# Patient Record
Sex: MALE | Race: ASIAN | NOT HISPANIC OR LATINO | URBAN - METROPOLITAN AREA
[De-identification: names, ages, dates, MRNs, and addresses within clinical notes are randomized per-mention and may not be internally consistent; named-entity substitution may affect disease eponyms.]

---

## 2019-04-16 ENCOUNTER — EMERGENCY (EMERGENCY)
Facility: HOSPITAL | Age: 64
LOS: 1 days | Discharge: HOME | End: 2019-04-16
Attending: EMERGENCY MEDICINE | Admitting: EMERGENCY MEDICINE
Payer: COMMERCIAL

## 2019-04-16 VITALS
TEMPERATURE: 98 F | WEIGHT: 186.07 LBS | DIASTOLIC BLOOD PRESSURE: 91 MMHG | HEIGHT: 70 IN | OXYGEN SATURATION: 98 % | HEART RATE: 88 BPM | RESPIRATION RATE: 16 BRPM | SYSTOLIC BLOOD PRESSURE: 147 MMHG

## 2019-04-16 PROCEDURE — 90715 TDAP VACCINE 7 YRS/> IM: CPT

## 2019-04-16 PROCEDURE — 99283 EMERGENCY DEPT VISIT LOW MDM: CPT | Mod: 25

## 2019-04-16 PROCEDURE — 12002 RPR S/N/AX/GEN/TRNK2.6-7.5CM: CPT

## 2019-04-16 PROCEDURE — 90471 IMMUNIZATION ADMIN: CPT

## 2019-04-16 PROCEDURE — 99283 EMERGENCY DEPT VISIT LOW MDM: CPT

## 2019-04-16 RX ORDER — TETANUS TOXOID, REDUCED DIPHTHERIA TOXOID AND ACELLULAR PERTUSSIS VACCINE, ADSORBED 5; 2.5; 8; 8; 2.5 [IU]/.5ML; [IU]/.5ML; UG/.5ML; UG/.5ML; UG/.5ML
0.5 SUSPENSION INTRAMUSCULAR ONCE
Qty: 0 | Refills: 0 | Status: COMPLETED | OUTPATIENT
Start: 2019-04-16 | End: 2019-04-16

## 2019-04-16 RX ADMIN — TETANUS TOXOID, REDUCED DIPHTHERIA TOXOID AND ACELLULAR PERTUSSIS VACCINE, ADSORBED 0.5 MILLILITER(S): 5; 2.5; 8; 8; 2.5 SUSPENSION INTRAMUSCULAR at 16:06

## 2019-04-16 NOTE — ED PROVIDER NOTE - MUSCULOSKELETAL, MLM
Spine appears normal, range of motion is not limited, no muscle or joint tenderness; +4cm superficial irregular laceration noted to palmar aspect of L wrist, FROM wrist, no FB noted, no tendon involvement noted, no obvious deformities noted, fingers warm & mobile, cap refill<2sec, able to make a fist, no swelling or erythema noted, pulses and sensation intact, NVI

## 2019-04-16 NOTE — ED PROVIDER NOTE - OBJECTIVE STATEMENT
62 y/o M presents with c/o laceration to L wrist x 1 hour. Pt states that he was throwing out toilet into a dumpster and a piece of the toilet cut his L wrist. Pt does not recall his last tetanus. Pt is R hand dominant. Denies numbness, tingling, FB, other injuries/symptoms.

## 2019-04-16 NOTE — ED PROVIDER NOTE - ATTENDING CONTRIBUTION TO CARE
62 yo M sp wrist laceration today. No numb/ting/focal weak. min bleeding. no other acute inj.  Exam with 4cm superficial wrist lac. No involvement of deeper structures. Nl dist str/sens equal bl, nl cap refill, 2+ pulses. nl neuro exam  Suture by PA, outholley gilbert  Discussed at length with patient / patients family in regards to the wound. Discussed wound care instructions, including short term care and long term / scar treatment and precautions. Also discussed infection precautions, including signs and symptoms to watch for and proper care / treatment. Patient will be following up with outpatient doctor as soon as possible and will otherwise return to ED with any changes, concerns or issues. Demonstration of understanding of all instructions / precautions was verbalized.

## 2019-04-16 NOTE — ED PROVIDER NOTE - CLINICAL SUMMARY MEDICAL DECISION MAKING FREE TEXT BOX
64 yo M with L wrist laceration from piece of toilet while being thrown into the dumpster an hour ago, R hand dominant, NVI. no fb/tendon involvement, superficial 4cm lac to palmar L wrist, FROM, will update tdap, repair lac, dc home

## 2019-04-16 NOTE — ED PROVIDER NOTE - PROGRESS NOTE DETAILS
Pt examined by ED attending, Dr. Pavon who agreed with disposition and plan. Laceration repaired, pt tolerated well, will d/c home and f/u pmd.

## 2021-03-22 ENCOUNTER — EMERGENCY (EMERGENCY)
Facility: HOSPITAL | Age: 66
LOS: 1 days | Discharge: HOME | End: 2021-03-22
Attending: STUDENT IN AN ORGANIZED HEALTH CARE EDUCATION/TRAINING PROGRAM | Admitting: INTERNAL MEDICINE
Payer: COMMERCIAL

## 2021-03-22 VITALS
HEIGHT: 70 IN | OXYGEN SATURATION: 99 % | DIASTOLIC BLOOD PRESSURE: 91 MMHG | RESPIRATION RATE: 16 BRPM | TEMPERATURE: 98 F | HEART RATE: 79 BPM | WEIGHT: 188.05 LBS | SYSTOLIC BLOOD PRESSURE: 159 MMHG

## 2021-03-22 PROCEDURE — 73610 X-RAY EXAM OF ANKLE: CPT | Mod: 26,LT

## 2021-03-22 PROCEDURE — 99284 EMERGENCY DEPT VISIT MOD MDM: CPT

## 2021-03-22 PROCEDURE — 93971 EXTREMITY STUDY: CPT | Mod: 26,LT

## 2021-03-22 RX ORDER — IBUPROFEN 200 MG
600 TABLET ORAL ONCE
Refills: 0 | Status: COMPLETED | OUTPATIENT
Start: 2021-03-22 | End: 2021-03-22

## 2021-03-22 RX ADMIN — Medication 600 MILLIGRAM(S): at 23:10

## 2021-03-22 RX ADMIN — Medication 600 MILLIGRAM(S): at 22:10

## 2021-03-22 NOTE — ED PROVIDER NOTE - PATIENT PORTAL LINK FT
You can access the FollowMyHealth Patient Portal offered by [facility] by registering at the following website: [email or web address] By joining [facility] [employer]’s FollowMyHealth portal, you will also be able to view your health information using other applications (apps) compatible with our system.

## 2021-03-22 NOTE — ED PROVIDER NOTE - CLINICAL SUMMARY MEDICAL DECISION MAKING FREE TEXT BOX
presents today due to left ankle swelling and pain x 4 days. pt reports he went to urgent care in which he was recommended to get US to r/o DVT. no CP or SOB. plan includes US r/o DVT, xray ankle r/o fx, re-assess

## 2021-03-22 NOTE — ED PROVIDER NOTE - PHYSICAL EXAMINATION
Constitutional: Awake, Alert, non-toxic. NAD. Well appearing, well nourished.   HEAD: Normocephalic, atraumatic.   EYES: EOM intact, conjunctiva and sclera are clear bilaterally.   ENT: No rhinorrhea, patent, mucous membranes pink/moist, no drooling or stridor.   NECK: Supple, non-tender  CARDIOVASCULAR: Normal S1, S2; regular rate and rhythm.  RESPIRATORY: Normal respiratory effort; breath sounds CTAB, no wheezes, rhonchi, or rales. Speaking in full sentences. No accessory muscle use.   ABDOMEN: Soft; non-tender, non-distended.   EXTREMITIES: Full passive and active ROM in all extremities; ankle and knee non-tender to palpation; distal pulses palpable and symmetric, negative Pascale sign, no LE edema.   SKIN: Warm, dry; good skin turgor, no apparent lesions or rashes, no ecchymosis, brisk capillary refill.  NEURO: A&O x3. Sensory and motor functions are grossly intact. Speech is normal. Appearance and judgement seem appropriate for gender and age.

## 2021-03-22 NOTE — ED PROVIDER NOTE - NSFOLLOWUPCLINICS_GEN_ALL_ED_FT
[facility]  Urgent Care  [address] [place] [id number]  Phone: [phone]  Fax: [phone]  Follow Up Time:

## 2021-03-22 NOTE — ED PROVIDER NOTE - NSFOLLOWUPINSTRUCTIONS_ED_ALL_ED_FT
Phlebitis    WHAT YOU NEED TO KNOW:    Phlebitis is inflammation of the wall of your vein. Inflammation may be caused by damage to or infection of your vein. Phlebitis may occur in a vein in your arm or leg. Symptoms include pain, redness, and swelling near the vein. Symptoms may appear when you are receiving an IV medication, or 48 to 96 hours after you receive the medicine.     Phlebitis         DISCHARGE INSTRUCTIONS:    Return to the emergency department if:   •Your leg or arm turns pale or blue.      •Your leg or arm feels hot or cold.      •Your arm or leg feels warm, tender, and painful. It may look swollen and red.      Contact your healthcare provider if:   •You have a fever.       •You have more pain, swelling, or warmth near your vein.       •Your symptoms do not improve within 72 hours.       •You have questions or concerns about your condition or care.      Medicines:   •NSAIDs, such as ibuprofen, help decrease swelling, pain, and fever. NSAIDs can cause stomach bleeding or kidney problems in certain people. If you take blood thinner medicine, always ask your healthcare provider if NSAIDs are safe for you. Always read the medicine label and follow directions.      •Take your medicine as directed. Contact your healthcare provider if you think your medicine is not helping or if you have side effects. Tell him of her if you are allergic to any medicine. Keep a list of the medicines, vitamins, and herbs you take. Include the amounts, and when and why you take them. Bring the list or the pill bottles to follow-up visits. Carry your medicine list with you in case of an emergency.      Follow up with your healthcare provider as directed: Write down your questions so you remember to ask them during your visits.    Manage your symptoms:   •Apply a warm compress to your vein. This will help decrease swelling and pain. Wet a washcloth in warm water. Do not use hot water. Apply the warm compress for 10 minutes. Repeat this 4 times each day.       •Elevate your leg or arm above the level of your heart as often as you can. This will help decrease swelling and pain. Prop your leg or arm on pillows or blankets to keep it elevated comfortably.       •Wear pressure stockings if directed. Pressure stockings improve blood flow and help decrease pain and swelling. Pressure stockings can also help decrease your risk for blood clots in your legs. Wear the stockings during the day. Do not wear them overnight when you sleep.   Pressure Stockings            •Do not inject illegal drugs. Talk to your healthcare provider if you use IV drugs and need help to quit. Antibiotic prescribed for Phlebitis, Motrin as needed , aspirin 325mg enteric coated daily, not to take with the Motrin. F/U with your doctor, if you have increased pain or swelling the clot may be moving deeper and you will need evaluation immediately     WHAT YOU NEED TO KNOW:    Phlebitis is inflammation of the wall of your vein. Inflammation may be caused by damage to or infection of your vein. Phlebitis may occur in a vein in your arm or leg. Symptoms include pain, redness, and swelling near the vein. Symptoms may appear when you are receiving an IV medication, or 48 to 96 hours after you receive the medicine.     Phlebitis         DISCHARGE INSTRUCTIONS:    Return to the emergency department if:   •Your leg or arm turns pale or blue.      •Your leg or arm feels hot or cold.      •Your arm or leg feels warm, tender, and painful. It may look swollen and red.      Contact your healthcare provider if:   •You have a fever.       •You have more pain, swelling, or warmth near your vein.       •Your symptoms do not improve within 72 hours.       •You have questions or concerns about your condition or care.      Medicines:   •NSAIDs, such as ibuprofen, help decrease swelling, pain, and fever. NSAIDs can cause stomach bleeding or kidney problems in certain people. If you take blood thinner medicine, always ask your healthcare provider if NSAIDs are safe for you. Always read the medicine label and follow directions.      •Take your medicine as directed. Contact your healthcare provider if you think your medicine is not helping or if you have side effects. Tell him of her if you are allergic to any medicine. Keep a list of the medicines, vitamins, and herbs you take. Include the amounts, and when and why you take them. Bring the list or the pill bottles to follow-up visits. Carry your medicine list with you in case of an emergency.      Follow up with your healthcare provider as directed: Write down your questions so you remember to ask them during your visits.    Manage your symptoms:   •Apply a warm compress to your vein. This will help decrease swelling and pain. Wet a washcloth in warm water. Do not use hot water. Apply the warm compress for 10 minutes. Repeat this 4 times each day.       •Elevate your leg or arm above the level of your heart as often as you can. This will help decrease swelling and pain. Prop your leg or arm on pillows or blankets to keep it elevated comfortably.       •Wear pressure stockings if directed. Pressure stockings improve blood flow and help decrease pain and swelling. Pressure stockings can also help decrease your risk for blood clots in your legs. Wear the stockings during the day. Do not wear them overnight when you sleep.   Pressure Stockings            •Do not inject illegal drugs. Talk to your healthcare provider if you use IV drugs and need help to quit.

## 2021-03-22 NOTE — ED PROVIDER NOTE - ATTENDING CONTRIBUTION TO CARE
66 y/o male without reported PMHx presents today due to left lateral ankle swelling and pain x 4 days, radiates to calf. pt reports he went to urgent care in which he was recommended to get US to r/o DVT. pt describes pain as aching, radiating to calf, and currently 5/10. pt denies chest pain, SOB, hx of DVT, recent travel/surgery, hemoptysis, hx of DVT, injury, numbness/weakness, or any other complaints.  very mild swelling of lateral ankle, + from motoin, no erythema or warmth, no calf swelling or tenderness  will get US and XT, pain c ontrol, pmd follow up

## 2021-03-22 NOTE — ED ADULT NURSE REASSESSMENT NOTE - NS ED NURSE REASSESS COMMENT FT1
Patient brought to xray then back awaiting result; called ultrasound and was advised that there is 2 more ahead of him Patient brought to xray then back awaiting result; called ultrasound and was advised that there are 2 more patients ahead of him

## 2021-03-22 NOTE — ED ADULT TRIAGE NOTE - CHIEF COMPLAINT QUOTE
Patient complaining of left ankle swelling and pain for 4 days went to urgent care today and was referred to go to ED for possible blood clot denies taking any pain medication

## 2021-03-22 NOTE — ED ADULT NURSE NOTE - OBJECTIVE STATEMENT
Patient presents to ED with complaint of left ankle swelling started 4 days ago was seen at urgent care and was advised to go to ED for evaluation for possible blood clot was seen and examined by MELCHOR Gunter at this time.

## 2021-03-22 NOTE — ED PROVIDER NOTE - OBJECTIVE STATEMENT
66 y/o male without reported PMHx presents today due to left ankle swelling and pain x 4 days. pt reports he went to urgent care in which he was recommended to get US to r/o DVT. pt describes pain as aching, radiating to calf, and currently 5/10. pt denies chest pain, SOB, hx of DVT, recent travel/surgery, hemoptysis, hx of DVT, injury, numbness/weakness, or any other complaints.

## 2021-03-23 VITALS
DIASTOLIC BLOOD PRESSURE: 90 MMHG | HEART RATE: 74 BPM | SYSTOLIC BLOOD PRESSURE: 143 MMHG | TEMPERATURE: 98 F | OXYGEN SATURATION: 97 % | RESPIRATION RATE: 16 BRPM

## 2021-03-23 PROBLEM — Z78.9 OTHER SPECIFIED HEALTH STATUS: Chronic | Status: ACTIVE | Noted: 2019-04-16

## 2021-03-23 PROCEDURE — 93971 EXTREMITY STUDY: CPT

## 2021-03-23 PROCEDURE — 99284 EMERGENCY DEPT VISIT MOD MDM: CPT | Mod: 25

## 2021-03-23 PROCEDURE — 73610 X-RAY EXAM OF ANKLE: CPT

## 2021-03-23 RX ORDER — ASPIRIN/CALCIUM CARB/MAGNESIUM 324 MG
325 TABLET ORAL DAILY
Refills: 0 | Status: DISCONTINUED | OUTPATIENT
Start: 2021-03-23 | End: 2021-03-26

## 2021-03-23 RX ADMIN — Medication 325 MILLIGRAM(S): at 00:18

## 2021-03-23 RX ADMIN — Medication 1 TABLET(S): at 00:18

## 2022-03-08 NOTE — ED ADULT TRIAGE NOTE - HEART RATE (BEATS/MIN)
81 Consent: The patient's consent was obtained including but not limited to risks of crusting, scabbing, blistering, scarring, darker or lighter pigmentary change, recurrence, incomplete removal and infection. Show Aperture Variable?: Yes Number Of Freeze-Thaw Cycles: 1 freeze-thaw cycle Duration Of Freeze Thaw-Cycle (Seconds): 10 Post-Care Instructions: I reviewed with the patient in detail post-care instructions. Patient is to wear sunprotection, and avoid picking at any of the treated lesions. Pt may apply Vaseline to crusted or scabbing areas. Render Note In Bullet Format When Appropriate: No Detail Level: Zone

## 2022-07-16 NOTE — ED PROVIDER NOTE - NS ED ATTENDING STATEMENT MOD
97.4
I have personally performed a face to face diagnostic evaluation on this patient. I have reviewed the ACP note and agree with the history, exam and plan of care, except as noted.
